# Patient Record
Sex: FEMALE | Race: WHITE | Employment: OTHER | URBAN - METROPOLITAN AREA
[De-identification: names, ages, dates, MRNs, and addresses within clinical notes are randomized per-mention and may not be internally consistent; named-entity substitution may affect disease eponyms.]

---

## 2017-01-23 DIAGNOSIS — E55.9 HYPOVITAMINOSIS D: ICD-10-CM

## 2017-01-23 DIAGNOSIS — E78.5 HYPERLIPIDEMIA LDL GOAL <160: ICD-10-CM

## 2020-01-01 ENCOUNTER — HOSPITAL ENCOUNTER (EMERGENCY)
Age: 85
End: 2020-05-07
Attending: EMERGENCY MEDICINE
Payer: MEDICARE

## 2020-01-01 ENCOUNTER — APPOINTMENT (OUTPATIENT)
Dept: GENERAL RADIOLOGY | Age: 85
End: 2020-01-01
Attending: EMERGENCY MEDICINE
Payer: MEDICARE

## 2020-01-01 ENCOUNTER — HOSPITAL ENCOUNTER (EMERGENCY)
Age: 85
End: 2020-05-06

## 2020-01-01 VITALS
HEART RATE: 121 BPM | SYSTOLIC BLOOD PRESSURE: 87 MMHG | RESPIRATION RATE: 24 BRPM | OXYGEN SATURATION: 96 % | DIASTOLIC BLOOD PRESSURE: 71 MMHG

## 2020-01-01 DIAGNOSIS — I46.9 CARDIAC ARREST (HCC): Primary | ICD-10-CM

## 2020-01-01 LAB
ALBUMIN SERPL-MCNC: 2.7 G/DL (ref 3.4–5)
ALBUMIN/GLOB SERPL: 0.7 {RATIO} (ref 0.8–1.7)
ALP SERPL-CCNC: 124 U/L (ref 45–117)
ALT SERPL-CCNC: 81 U/L (ref 13–56)
ANION GAP SERPL CALC-SCNC: 13 MMOL/L (ref 3–18)
AST SERPL-CCNC: 92 U/L (ref 10–38)
BASOPHILS # BLD: 0 K/UL (ref 0–0.06)
BASOPHILS NFR BLD: 0 % (ref 0–3)
BILIRUB SERPL-MCNC: 0.5 MG/DL (ref 0.2–1)
BUN SERPL-MCNC: 31 MG/DL (ref 7–18)
BUN/CREAT SERPL: 19 (ref 12–20)
CALCIUM SERPL-MCNC: 7.6 MG/DL (ref 8.5–10.1)
CHLORIDE SERPL-SCNC: 114 MMOL/L (ref 100–111)
CK MB CFR SERPL CALC: 2.3 % (ref 0–4)
CK MB SERPL-MCNC: 3.4 NG/ML (ref 5–25)
CK SERPL-CCNC: 145 U/L (ref 26–192)
CO2 SERPL-SCNC: 18 MMOL/L (ref 21–32)
CREAT SERPL-MCNC: 1.65 MG/DL (ref 0.6–1.3)
DIFFERENTIAL METHOD BLD: ABNORMAL
EOSINOPHIL # BLD: 0 K/UL (ref 0–0.4)
EOSINOPHIL NFR BLD: 0 % (ref 0–5)
ERYTHROCYTE [DISTWIDTH] IN BLOOD BY AUTOMATED COUNT: 14.5 % (ref 11.6–14.5)
GLOBULIN SER CALC-MCNC: 3.7 G/DL (ref 2–4)
GLUCOSE SERPL-MCNC: 300 MG/DL (ref 74–99)
HCT VFR BLD AUTO: 42.5 % (ref 35–45)
HGB BLD-MCNC: 13.3 G/DL (ref 12–16)
LACTATE BLD-SCNC: 8.81 MMOL/L (ref 0.4–2)
LYMPHOCYTES # BLD: 6.5 K/UL (ref 0.8–3.5)
LYMPHOCYTES NFR BLD: 33 % (ref 20–51)
MAGNESIUM SERPL-MCNC: 2.8 MG/DL (ref 1.6–2.6)
MCH RBC QN AUTO: 31.4 PG (ref 24–34)
MCHC RBC AUTO-ENTMCNC: 31.3 G/DL (ref 31–37)
MCV RBC AUTO: 100.5 FL (ref 74–97)
MONOCYTES # BLD: 1 K/UL (ref 0–1)
MONOCYTES NFR BLD: 5 % (ref 2–9)
MYELOCYTES NFR BLD MANUAL: 1 %
NEUTS BAND NFR BLD MANUAL: 7 % (ref 0–5)
NEUTS SEG # BLD: 12 K/UL (ref 1.8–8)
NEUTS SEG NFR BLD: 54 % (ref 42–75)
PLATELET # BLD AUTO: 219 K/UL (ref 135–420)
PLATELET COMMENTS,PCOM: ABNORMAL
PMV BLD AUTO: 10.9 FL (ref 9.2–11.8)
POTASSIUM SERPL-SCNC: 4.9 MMOL/L (ref 3.5–5.5)
PROT SERPL-MCNC: 6.4 G/DL (ref 6.4–8.2)
RBC # BLD AUTO: 4.23 M/UL (ref 4.2–5.3)
RBC MORPH BLD: ABNORMAL
SODIUM SERPL-SCNC: 145 MMOL/L (ref 136–145)
TROPONIN I BLD-MCNC: 0.08 NG/ML (ref 0–0.08)
TROPONIN I SERPL-MCNC: 0.13 NG/ML (ref 0–0.04)
WBC # BLD AUTO: 19.6 K/UL (ref 4.6–13.2)

## 2020-01-01 PROCEDURE — 96365 THER/PROPH/DIAG IV INF INIT: CPT

## 2020-01-01 PROCEDURE — 74011250636 HC RX REV CODE- 250/636: Performed by: EMERGENCY MEDICINE

## 2020-01-01 PROCEDURE — 75810000165 HC THORACENTESIS

## 2020-01-01 PROCEDURE — 99285 EMERGENCY DEPT VISIT HI MDM: CPT

## 2020-01-01 PROCEDURE — 80053 COMPREHEN METABOLIC PANEL: CPT

## 2020-01-01 PROCEDURE — 84484 ASSAY OF TROPONIN QUANT: CPT

## 2020-01-01 PROCEDURE — 85025 COMPLETE CBC W/AUTO DIFF WBC: CPT

## 2020-01-01 PROCEDURE — 83735 ASSAY OF MAGNESIUM: CPT

## 2020-01-01 PROCEDURE — 94002 VENT MGMT INPAT INIT DAY: CPT

## 2020-01-01 PROCEDURE — 93005 ELECTROCARDIOGRAM TRACING: CPT

## 2020-01-01 PROCEDURE — 74011000250 HC RX REV CODE- 250: Performed by: EMERGENCY MEDICINE

## 2020-01-01 PROCEDURE — 83605 ASSAY OF LACTIC ACID: CPT

## 2020-01-01 PROCEDURE — 82550 ASSAY OF CK (CPK): CPT

## 2020-01-01 PROCEDURE — 75810000455 HC PLCMT CENT VENOUS CATH LVL 2 5182

## 2020-01-01 PROCEDURE — 92950 HEART/LUNG RESUSCITATION CPR: CPT

## 2020-01-01 RX ORDER — NOREPINEPHRINE BITARTRATE/D5W 8 MG/250ML
.5-3 PLASTIC BAG, INJECTION (ML) INTRAVENOUS
Status: DISCONTINUED | OUTPATIENT
Start: 2020-01-01 | End: 2020-05-07 | Stop reason: HOSPADM

## 2020-01-01 RX ORDER — CALCIUM CHLORIDE INJECTION 100 MG/ML
INJECTION, SOLUTION INTRAVENOUS
Status: COMPLETED | OUTPATIENT
Start: 2020-01-01 | End: 2020-01-01

## 2020-01-01 RX ORDER — NOREPINEPHRINE BITARTRATE/D5W 8 MG/250ML
.5-16 PLASTIC BAG, INJECTION (ML) INTRAVENOUS
Status: DISCONTINUED | OUTPATIENT
Start: 2020-01-01 | End: 2020-01-01

## 2020-01-01 RX ORDER — EPINEPHRINE 0.1 MG/ML
INJECTION INTRACARDIAC; INTRAVENOUS
Status: COMPLETED | OUTPATIENT
Start: 2020-01-01 | End: 2020-01-01

## 2020-01-01 RX ADMIN — CALCIUM CHLORIDE 1 G: 100 INJECTION INTRAVENOUS; INTRAVENTRICULAR at 17:32

## 2020-01-01 RX ADMIN — Medication 20 MCG/MIN: at 17:11

## 2020-01-01 RX ADMIN — Medication 1 MG: at 17:04

## 2020-01-01 RX ADMIN — Medication 1 MG: at 16:54

## 2020-05-06 NOTE — PROGRESS NOTES
responded to Death of  Floyd Gonzalez, who was a 77 y.o.,female, The  provided the following Interventions: 
Patient was brought in by paramedics on [employer]. Provided crisis pastoral care, pastoral support and grief interventions to patient's , Rosenda Triplett and caregiver son, Neri De Paz. No  home arrangements made yet. Will call NS once determined. Offered prayers on behalf of the patient. Chart reviewed. Plan: 
Chaplains will continue to follow and will provide pastoral care on an as needed/requested basis and grief support for the family. [facility], [address] [address]  
[phone]

## 2020-05-06 NOTE — ED NOTES
PT arrived via ems from home after being found down in the bathroom- When medics arrived pt had a pulse but by the time they got to the engine pt was pulseless. CPR started. 2 rounds of EPI given in route and they achieved ROSC. Upon arrival to ED pt arrested again, Pt arrived on cari device at 1646

## 2020-05-06 NOTE — ED PROVIDER NOTES
EMERGENCY DEPARTMENT HISTORY AND PHYSICAL EXAM 
 
6:02 PM 
Date: 5/6/2020 Patient Name: Jannet Holly History of Presenting Illness Chief Complaint Patient presents with  Cardiac arrest  
 
 
History Provided By: patient HPI: Jannet Holly is a 80 y.o. female with PMHx of advanced dementia, hypertension, hypercholesterolemia presents in cardiac arrest.  Patient was found unresponsive by her son at home. He started chest compressions. Son said that he had seen her going to the bathroom a few minutes earlier and found her slumped over. Patient was in PEA received CPR and three rounds of epi the field. Patient had ROSC for ten minutes en route but was in PEA on arrival. Intubated in the field. According to the son patient was not ill the past few days. PCP: None Past History Past Medical History: No past medical history on file. Past Surgical History: No past surgical history on file. Family History: No family history on file. Social History: 
Social History Tobacco Use  Smoking status: Not on file Substance Use Topics  Alcohol use: Not on file  Drug use: Not on file Allergies: 
No Known Allergies Review of Systems Review of Systems Unable to perform ROS: Acuity of condition Physical Exam  
 
No data found. Physical Exam  
CONSTITUTIONAL: Well-developed, well-nourished individual in full arrest, dusky coloration, BVM ventilation. Patient is intubated. HEAD: Normocephalic, atraumatic. EYES: Pupils are fixed, 6mm. Eyelids, conjunctiva, iris, and sclera are normal. 
EARS: External ears are normal. 
NOSE: The nose is normal in appearance. MOUTH/DENTAL: The lips are dusky in coloration, gums, and teeth appear normal.  There are no exudates or erosions on the buccal mucosa. The uvula is mid-line. The tonsils are not inflamed or erythematous. The posterior pharynx is free from erythema and exudates. NECK: The trachea is mid-line. The neck is supple and non-tender to palpation. There is no cervical lymphadenopathy. There is no JVD. CHEST: No evidence of trauma or deformity. Non-tender to palpation. No crepitus or paradoxical movements. Chest excursion is non-existent. CARDIOVASCULAR: The heart is in complete stand-still. LUNGS: Coarse breath sounds with BVM ventilation. No spontaneous respirations. GI/ABDOMEN: The abdomen is mildly distended in appearance. PELVIS: No evidence of trauma or deformity. Negative pelvic rock. no evidence of instability. MUSCULOSKELETAL: There are no deformities noted in all four extremities. Pulseless. INTEGUMENTARY: The skin is dusky and cool. NEUROLOGICAL: Unresponsive. PSYCHOLOGICAL: Unresponsive. Diagnostic Study Results Labs - No results found for this or any previous visit (from the past 12 hour(s)). Radiologic Studies - No results found. Medical Decision Making ED Course: Progress Notes, Reevaluation, and Consults: 
 
6:02 PM Initial assessment performed. The patients presenting problems have been discussed, and they/their family are in agreement with the care plan formulated and outlined with them. I have encouraged them to ask questions as they arise throughout their visit. Provider Notes (Medical Decision Making):  
Patient arrived in PEA Patient intubated in the field. ETT tube position was checked with glidedoscope CPR continued Patient received multiple rounds of epi, calcium, magnesium and bicarbonate ROSC twice. Patient started on pressors and central line inserted (see  Procedure note). Discussed with family (son and ) poor prognosis due to prolonged CPR Family also concern that patient has poor outlook due to her advanced dementia They didn't want further resuscitation if patient loses pulse Patient went to cardiac arrest again Cardiac standstill confirmed with Ultrasound.  
Time of death 5:52 PM 
 Discussed with family in the presence of  and answered questions Called ME office- case was not accepted as a medical examiner case Procedures:  
 
Central Line 
Date/Time: 5/6/2020 6:03 PM 
Performed by: Abilio Warren MD 
Authorized by: Louana Koyanagi, MD  
 
Consent:  
  Consent obtained:  Emergent situation Pre-procedure details:  
  Hand hygiene: Hand hygiene performed prior to insertion Skin preparation:  2% chlorhexidine Anesthesia (see MAR for exact dosages): Anesthesia method:  None Procedure details: Location:  R internal jugular Patient position:  Flat Catheter size:  7.5 Fr Landmarks identified: yes Ultrasound guidance: yes Number of attempts:  1 Successful placement: yes Post-procedure details: Post-procedure:  Line sutured and dressing applied Assessment:  Blood return through all ports Patient tolerance of procedure: Tolerated well, no immediate complications I was present and directly supervised central line insertion Critical Care Time: CRITICAL CARE: 
9:43 AM 
I have spent 120 minutes of critical care time involved in lab review, consultations with specialist, family decision-making, and documentation. During this entire length of time I was immediately available to the patient. This time excludes separately billable procedures. Critical Care: The reason for providing this level of medical care for this critically ill patient was due a critical illness that impaired one or more vital organ systems such that there was a high probability of imminent or life threatening deterioration in the patients condition. This care involved high complexity decision making to assess, manipulate, and support vital system functions, to treat this degreee vital organ system failure and to prevent further life threatening deterioration of the patients condition. Vital Signs-Reviewed the patient's vital signs.  Reviewed pt's pulse ox reading. Records Reviewed:  old medical records (Time of Review: 6:02 PM) 
-I am the first provider for this patient. 
-I reviewed the vital signs, available nursing notes, past medical history, past surgical history, family history and social history. Clinical Impression Clinical Impression: cardiac arrest 
 
Disposition:  This note was dictated utilizing voice recognition software which may lead to typographical errors. I apologize in advance if the situation occurs. If questions arise please do not hesitate to contact me or call our department.  
 
Doreen Geller MD 
6:02 PM

## 2020-05-07 NOTE — ED NOTES
PT cardiac arrest as Roxane Batres FUE:[id number] [facility]:[phone]  Please Merge charts.

## 2020-05-08 LAB
ATRIAL RATE: 138 BPM
CALCULATED R AXIS, ECG10: 91 DEGREES
CALCULATED T AXIS, ECG11: -93 DEGREES
DIAGNOSIS, 93000: NORMAL
Q-T INTERVAL, ECG07: 300 MS
QRS DURATION, ECG06: 118 MS
QTC CALCULATION (BEZET), ECG08: 474 MS
VENTRICULAR RATE, ECG03: 150 BPM